# Patient Record
Sex: FEMALE | Race: WHITE | Employment: FULL TIME | URBAN - METROPOLITAN AREA
[De-identification: names, ages, dates, MRNs, and addresses within clinical notes are randomized per-mention and may not be internally consistent; named-entity substitution may affect disease eponyms.]

---

## 2017-05-26 DIAGNOSIS — E03.9 ACQUIRED HYPOTHYROIDISM: ICD-10-CM

## 2017-05-26 RX ORDER — LEVOTHYROXINE SODIUM 0.07 MG/1
75 TABLET ORAL DAILY
Qty: 30 TABLET | Refills: 0 | Status: SHIPPED | OUTPATIENT
Start: 2017-05-26 | End: 2017-07-06 | Stop reason: SDUPTHER

## 2017-06-30 ENCOUNTER — TELEPHONE (OUTPATIENT)
Dept: FAMILY MEDICINE CLINIC | Age: 52
End: 2017-06-30

## 2017-06-30 DIAGNOSIS — E03.9 ACQUIRED HYPOTHYROIDISM: ICD-10-CM

## 2017-06-30 RX ORDER — LEVOTHYROXINE SODIUM 0.07 MG/1
75 TABLET ORAL DAILY
Qty: 30 TABLET | Refills: 0 | Status: CANCELLED | OUTPATIENT
Start: 2017-06-30

## 2017-07-06 ENCOUNTER — OFFICE VISIT (OUTPATIENT)
Dept: FAMILY MEDICINE CLINIC | Age: 52
End: 2017-07-06

## 2017-07-06 VITALS
BODY MASS INDEX: 25.73 KG/M2 | DIASTOLIC BLOOD PRESSURE: 70 MMHG | HEIGHT: 68 IN | HEART RATE: 68 BPM | SYSTOLIC BLOOD PRESSURE: 116 MMHG | TEMPERATURE: 98.4 F | RESPIRATION RATE: 16 BRPM | WEIGHT: 169.8 LBS

## 2017-07-06 DIAGNOSIS — E66.3 OVERWEIGHT: ICD-10-CM

## 2017-07-06 DIAGNOSIS — I87.2 VENOUS INSUFFICIENCY: Primary | ICD-10-CM

## 2017-07-06 DIAGNOSIS — E03.9 ACQUIRED HYPOTHYROIDISM: ICD-10-CM

## 2017-07-06 DIAGNOSIS — Z12.11 COLON CANCER SCREENING: ICD-10-CM

## 2017-07-06 PROCEDURE — 99214 OFFICE O/P EST MOD 30 MIN: CPT | Performed by: FAMILY MEDICINE

## 2017-07-06 RX ORDER — LEVOTHYROXINE SODIUM 0.07 MG/1
75 TABLET ORAL DAILY
Qty: 30 TABLET | Refills: 3 | Status: SHIPPED | OUTPATIENT
Start: 2017-07-06 | End: 2017-11-17 | Stop reason: SDUPTHER

## 2017-07-06 ASSESSMENT — PATIENT HEALTH QUESTIONNAIRE - PHQ9
SUM OF ALL RESPONSES TO PHQ QUESTIONS 1-9: 0
1. LITTLE INTEREST OR PLEASURE IN DOING THINGS: 0
2. FEELING DOWN, DEPRESSED OR HOPELESS: 0
SUM OF ALL RESPONSES TO PHQ9 QUESTIONS 1 & 2: 0

## 2017-11-14 ENCOUNTER — TELEPHONE (OUTPATIENT)
Dept: FAMILY MEDICINE CLINIC | Age: 52
End: 2017-11-14

## 2017-11-14 NOTE — TELEPHONE ENCOUNTER
The patient was sent over via scheduling dept. Due to her work schedule being extremely crazy, she is requesting an appointment with Dr. Meghann Love for a medication refill specifically for this Friday or next  Wednesday. She has been informed that the doctor is not in the office on wednesdays. Worst case scenario, she is willing to see someone other than Dr. Meghann Love on Wednesday. May she be double booked this Friday or see another physician next Wednesday to refill her levothyroxine (LEVOTHROID) 75 MCG tablet. The patient did not indicate a specific time, only that these are the only days off for a while. [phone] (home)   Please approve or deny. Thank you!

## 2017-11-17 ENCOUNTER — OFFICE VISIT (OUTPATIENT)
Dept: FAMILY MEDICINE CLINIC | Age: 52
End: 2017-11-17

## 2017-11-17 VITALS
WEIGHT: 167 LBS | HEIGHT: 68 IN | TEMPERATURE: 98.6 F | BODY MASS INDEX: 25.31 KG/M2 | SYSTOLIC BLOOD PRESSURE: 104 MMHG | OXYGEN SATURATION: 95 % | HEART RATE: 77 BPM | DIASTOLIC BLOOD PRESSURE: 82 MMHG

## 2017-11-17 DIAGNOSIS — Z12.11 SCREEN FOR COLON CANCER: ICD-10-CM

## 2017-11-17 DIAGNOSIS — E03.9 ACQUIRED HYPOTHYROIDISM: Primary | ICD-10-CM

## 2017-11-17 DIAGNOSIS — Z12.39 SCREENING FOR BREAST CANCER: ICD-10-CM

## 2017-11-17 PROCEDURE — 99213 OFFICE O/P EST LOW 20 MIN: CPT | Performed by: FAMILY MEDICINE

## 2017-11-17 RX ORDER — LEVOTHYROXINE SODIUM 0.07 MG/1
75 TABLET ORAL DAILY
Qty: 90 TABLET | Refills: 3 | Status: SHIPPED | OUTPATIENT
Start: 2017-11-17 | End: 2018-11-11 | Stop reason: SDUPTHER

## 2017-11-17 NOTE — PATIENT INSTRUCTIONS
dose, do NOT take a double dose of medicine. Take your usual dose the next day. · Tell your doctor about all prescription, herbal, or over-the-counter products you take. · Take care of yourself. Eat a healthy diet, get enough sleep, and get regular exercise. When should you call for help? Call 911 anytime you think you may need emergency care. For example, call if:  · You passed out (lost consciousness). · You have severe trouble breathing. · You have a very slow heartbeat (less than 60 beats a minute). · You have a low body temperature (95°F or below). Call your doctor now or seek immediate medical care if:  · You feel tired, sluggish, or weak. · You have trouble remembering things or concentrating. · You do not begin to feel better 2 weeks after starting your medicine. Watch closely for changes in your health, and be sure to contact your doctor if you have any problems. Where can you learn more? Go to [email or web address]. org and sign in to your [employer] account. Enter M539 in the [employer] box to learn more about \"Hypothyroidism: Care Instructions. \"     If you do not have an account, please click on the \"Sign Up Now\" link. Current as of: July 28, 2016  Content Version: 11.3  © 9638-8112 [employer]. Care instructions adapted under license by [facility] ([facility]). If you have questions about a medical condition or this instruction, always ask your healthcare professional. [place] [address] any warranty or liability for your use of this information.

## 2017-11-17 NOTE — PROGRESS NOTES
Chief Complaint   Patient presents with    Follow-up     LOV 17    Hypothyroidism    Obesity    Discuss Medications   Patient with hypothyroidism this is relatively stable  Needs repeat TSH and needs refill of medication  Tolerating this well    Venous insufficiency has been ongoing  Has seen specialists  Steps to improve circulation    Patient overweight still  Recommend weight loss    Recommend colon cancer screening    Patient presents for exam.      Patient Active Problem List   Diagnosis    Venous insufficiency    Acquired hypothyroidism    Patient overweight       has a current medication list which includes the following prescription(s): levothyroxine. Past Medical History:   Diagnosis Date    Dyslipidemia 2009    Hair loss 2009    Polyarthritis        Past Surgical History:   Procedure Laterality Date     SECTION      X2    TONSILLECTOMY  1972       family history is not on file. Social History     Social History    Marital status: Single     Spouse name: N/A    Number of children: N/A    Years of education: N/A     Occupational History    Not on file.      Social History Main Topics    Smoking status: Former Smoker    Smokeless tobacco: Not on file    Alcohol use No    Drug use: No    Sexual activity: Yes     Partners: Male     Other Topics Concern    Not on file     Social History Narrative    No narrative on file       No Known Allergies    Review of Systems - General ROS: fatigue, o/w negative  Psychological ROS: negative  ENT ROS: negative  Hematological and Lymphatic ROS: negative  Endocrine ROS: negative  Respiratory ROS: no cough, shortness of breath, or wheezing  Cardiovascular ROS: no chest pain or dyspnea on exertion  Gastrointestinal ROS: no abdominal pain, change in bowel habits, or black or bloody stools  Genito-Urinary ROS: no dysuria, trouble voiding, or hematuria  Musculoskeletal ROS: spider veins, arthralgia and myalgia, o/w negative  Neurological ROS: no TIA or stroke symptoms  Dermatological ROS: alopecia, o/w negative    Blood pressure 104/82, pulse 77, temperature 98.6 °F (37 °C), temperature source Temporal, height 5' 8\" (1.727 m), weight 167 lb (75.8 kg), SpO2 95 %. Physical Examination: General appearance - alert, well appearing, and in no distress  Mental status - alert, oriented to person, place, and time  Eyes - pupils equal and reactive, extraocular eye movements intact  Ears - bilateral TM's and external ear canals normal  Mouth - mucous membranes moist, pharynx normal without lesions  Neck - supple, no significant adenopathy  Lymphatics - no palpable lymphadenopathy, no hepatosplenomegaly  Chest - clear to auscultation, no wheezes, rales or rhonchi, symmetric air entry  Heart - normal rate, regular rhythm, normal S1, S2, no murmurs, rubs, clicks or gallops  Abdomen - soft, nontender, nondistended, no masses or organomegaly  Neurological - alert, oriented, normal speech, no focal findings or movement disorder noted  Musculoskeletal - spider veins, no joint tenderness, deformity or swelling  Skin - no rash    Assessment:    1. Acquired hypothyroidism  levothyroxine (LEVOTHROID) 75 MCG tablet    TSH with Reflex    Comprehensive Metabolic Panel   2. Screening for breast cancer  MORGAN DIGITAL SCREEN W CAD BILATERAL    Comprehensive Metabolic Panel   3.  Screen for colon cancer  POCT Fecal Immunochemical Test (FIT)    Comprehensive Metabolic Panel       Plan:      Orders Placed This Encounter   Procedures    MORGAN DIGITAL SCREEN W CAD BILATERAL     Standing Status:   Future     Standing Expiration Date:   1/17/2019     Order Specific Question:   Reason for exam:     Answer:   screening    TSH with Reflex     Standing Status:   Future     Standing Expiration Date:   11/17/2018    Comprehensive Metabolic Panel     Standing Status:   Future     Standing Expiration Date:   11/17/2018    POCT Fecal Immunochemical Test (FIT)     Standing Status:   Future

## 2017-11-30 ENCOUNTER — HOSPITAL ENCOUNTER (OUTPATIENT)
Dept: WOMENS IMAGING | Age: 52
Discharge: HOME | End: 2017-11-30
Payer: COMMERCIAL

## 2017-11-30 DIAGNOSIS — Z12.39 SCREENING FOR BREAST CANCER: ICD-10-CM

## 2017-11-30 PROCEDURE — G0202 SCR MAMMO BI INCL CAD: HCPCS

## 2018-09-24 ENCOUNTER — OFFICE VISIT (OUTPATIENT)
Dept: FAMILY MEDICINE CLINIC | Age: 53
End: 2018-09-24
Payer: COMMERCIAL

## 2018-09-24 VITALS
RESPIRATION RATE: 12 BRPM | SYSTOLIC BLOOD PRESSURE: 116 MMHG | WEIGHT: 169 LBS | DIASTOLIC BLOOD PRESSURE: 70 MMHG | HEIGHT: 68 IN | TEMPERATURE: 98 F | OXYGEN SATURATION: 96 % | HEART RATE: 70 BPM | BODY MASS INDEX: 25.61 KG/M2

## 2018-09-24 DIAGNOSIS — Z00.00 ANNUAL PHYSICAL EXAM: Primary | ICD-10-CM

## 2018-09-24 DIAGNOSIS — E66.3 PATIENT OVERWEIGHT: ICD-10-CM

## 2018-09-24 PROCEDURE — 99396 PREV VISIT EST AGE 40-64: CPT | Performed by: FAMILY MEDICINE

## 2018-09-24 ASSESSMENT — PATIENT HEALTH QUESTIONNAIRE - PHQ9
SUM OF ALL RESPONSES TO PHQ QUESTIONS 1-9: 0
1. LITTLE INTEREST OR PLEASURE IN DOING THINGS: 0
SUM OF ALL RESPONSES TO PHQ QUESTIONS 1-9: 0
SUM OF ALL RESPONSES TO PHQ9 QUESTIONS 1 & 2: 0
2. FEELING DOWN, DEPRESSED OR HOPELESS: 0

## 2018-11-11 DIAGNOSIS — E03.9 ACQUIRED HYPOTHYROIDISM: ICD-10-CM

## 2018-11-12 RX ORDER — LEVOTHYROXINE SODIUM 0.07 MG/1
75 TABLET ORAL DAILY
Qty: 90 TABLET | Refills: 3 | Status: SHIPPED | OUTPATIENT
Start: 2018-11-12

## 2018-12-07 ENCOUNTER — OFFICE VISIT (OUTPATIENT)
Dept: FAMILY MEDICINE CLINIC | Age: 53
End: 2018-12-07
Payer: COMMERCIAL

## 2018-12-07 VITALS
HEIGHT: 68 IN | SYSTOLIC BLOOD PRESSURE: 102 MMHG | BODY MASS INDEX: 25.4 KG/M2 | DIASTOLIC BLOOD PRESSURE: 80 MMHG | TEMPERATURE: 97.9 F | RESPIRATION RATE: 12 BRPM | HEART RATE: 54 BPM | WEIGHT: 167.6 LBS | OXYGEN SATURATION: 98 %

## 2018-12-07 DIAGNOSIS — L24.81 IRRITANT CONTACT DERMATITIS DUE TO METALS: Primary | ICD-10-CM

## 2018-12-07 PROCEDURE — 99213 OFFICE O/P EST LOW 20 MIN: CPT | Performed by: NURSE PRACTITIONER

## 2018-12-07 PROCEDURE — 96372 THER/PROPH/DIAG INJ SC/IM: CPT | Performed by: NURSE PRACTITIONER

## 2018-12-07 RX ORDER — METHYLPREDNISOLONE ACETATE 80 MG/ML
120 INJECTION, SUSPENSION INTRA-ARTICULAR; INTRALESIONAL; INTRAMUSCULAR; SOFT TISSUE ONCE
Status: COMPLETED | OUTPATIENT
Start: 2018-12-07 | End: 2018-12-07

## 2018-12-07 RX ADMIN — METHYLPREDNISOLONE ACETATE 120 MG: 80 INJECTION, SUSPENSION INTRA-ARTICULAR; INTRALESIONAL; INTRAMUSCULAR; SOFT TISSUE at 15:43

## 2018-12-07 NOTE — PROGRESS NOTES
Subjective  Office Visit  [address] [facility]  [place] [id number]  Dept: [phone]  Dept Fax: [phone]  Loc: [phone]    Earl Lay  YOB: 1965  Age: 52 y.o. Sex: female  Date of Assessment:  12/7/2018  PCP: Edmund Sow MD    Chief Complaint   Patient presents with    Rash     x 1.5 - 2 weeks. chest. keeps getting worse. not really spreading. did change fabric softner, but stopped and it helped other areas of the body, but not chest. tried OTC stuff, no relief. HPI      Patient wears a lot of \"costume jewelry\" (which she normally does without issue) but states she wore a big piece of jewlery and the rash started that night. She also had a sequence shirt on that day and is not sure which one irritated her. The only other think that has changed is that she added a new fabric softner and she noted her skin to be slightly more itchy but nothing like the rash. She stopped using the faboric softner. There was no relief. She states she is starting to notice some crusting on some parts of the rash and is not sure if she is getting an infection. Onset of symptoms was 10 days ago. Patient denies: fever, cough, congestion, sore throat, headache, arthralgia, abdominal pain, nausea, vomiting, dark urine, diarrhea, myalgia, crankiness, irritability, decrease in appetite, decrease in energy level. Patient has tried benadryl cream neosporin. , which has been not very effective. Alleviating Factors: nothing    Aggravating factors: touch    Personal, Social, Family History and Allergies    Patient's medications, allergies, past medical, surgical, social and family histories were reviewed and updated as appropriate. ROS    Review of Systems   Constitutional: Negative for activity change, appetite change, chills, diaphoresis, fatigue and fever. Skin: Positive for rash. Negative for wound.

## 2019-03-14 ENCOUNTER — TELEPHONE (OUTPATIENT)
Dept: FAMILY MEDICINE CLINIC | Age: 54
End: 2019-03-14

## 2019-08-26 ENCOUNTER — TELEPHONE (OUTPATIENT)
Dept: ADMINISTRATIVE | Age: 54
End: 2019-08-26